# Patient Record
Sex: FEMALE | Race: WHITE | NOT HISPANIC OR LATINO | URBAN - NONMETROPOLITAN AREA
[De-identification: names, ages, dates, MRNs, and addresses within clinical notes are randomized per-mention and may not be internally consistent; named-entity substitution may affect disease eponyms.]

---

## 2020-01-31 ENCOUNTER — IMPORTED ENCOUNTER (OUTPATIENT)
Dept: URBAN - NONMETROPOLITAN AREA CLINIC 1 | Facility: CLINIC | Age: 65
End: 2020-01-31

## 2020-01-31 PROBLEM — H43.813: Noted: 2020-01-31

## 2020-01-31 PROBLEM — H52.4: Noted: 2020-01-31

## 2020-01-31 PROBLEM — H52.13: Noted: 2020-01-31

## 2020-01-31 PROBLEM — H52.223: Noted: 2020-01-31

## 2020-01-31 PROBLEM — H25.13: Noted: 2020-01-31

## 2020-01-31 PROCEDURE — 92015 DETERMINE REFRACTIVE STATE: CPT

## 2020-01-31 PROCEDURE — 92004 COMPRE OPH EXAM NEW PT 1/>: CPT

## 2020-01-31 NOTE — PATIENT DISCUSSION
Compound Myopic Astigmatism OU w/Presbyopia-  discussed findings w/patient-  new spectacle Rx issued-  monitor yearly or prn Cataracts OU -  discussed findings w/patient-  no treatment indicated at this time -  UIV protection recommended-  monitor yearly w/BAT or prn PVD OU -  discussed findings w/patient-  The risk of retinal detachment in patients with PVDs was discussed with the patient and the warning signs of retinal detachment were carefully reviewed with the patient. -  The patient was warned to return to the office or contact the ophthalmologist on call immediately if they experience signs of retinal detachment or changes in vision from today -  monitor yearly or prn; 's Notes: MR 1/31/2020DFE 1/31/2020

## 2021-09-07 ENCOUNTER — IMPORTED ENCOUNTER (OUTPATIENT)
Dept: URBAN - NONMETROPOLITAN AREA CLINIC 1 | Facility: CLINIC | Age: 66
End: 2021-09-07

## 2021-09-07 PROCEDURE — 92015 DETERMINE REFRACTIVE STATE: CPT

## 2021-09-07 PROCEDURE — 92014 COMPRE OPH EXAM EST PT 1/>: CPT

## 2021-09-07 NOTE — PATIENT DISCUSSION
Compound Myopic Astigmatism OU w/Presbyopia-  discussed findings w/patient-  new spectacle Rx issued-  monitor yearly or prn Cataracts OU -  discussed findings w/patient-  no treatment indicated at this time -  UV protection recommended-  monitor yearly w/BAT or prn PVD OU -  discussed findings w/patient-  The risk of retinal detachment in patients with PVDs was discussed with the patient and the warning signs of retinal detachment were carefully reviewed with the patient. -  The patient was warned to return to the office or contact the ophthalmologist on call immediately if they experience signs of retinal detachment or changes in vision from today -  monitor yearly or prn ALBINO OU-  discussed findings w/patient-  start Lotemax QID OU-  start Restasis BID OU-  RTC 2 weeks f/u or prn; 's Notes:  9/7/2021DFE 9/7/2021

## 2021-09-08 PROBLEM — H43.813: Noted: 2020-01-31

## 2021-09-08 PROBLEM — H52.4: Noted: 2020-01-31

## 2021-09-08 PROBLEM — H25.13: Noted: 2020-01-31

## 2021-09-08 PROBLEM — H16.223: Noted: 2021-09-08

## 2021-09-08 PROBLEM — H52.13: Noted: 2020-01-31

## 2021-09-08 PROBLEM — H52.223: Noted: 2020-01-31

## 2022-04-09 ASSESSMENT — VISUAL ACUITY
OU_CC: J1+
OD_SC: 20/20
OU_SC: 20/20
OD_SC: 20/20-1
OS_SC: 20/20
OS_SC: 20/20-1

## 2022-04-09 ASSESSMENT — TONOMETRY
OS_IOP_MMHG: 14
OD_IOP_MMHG: 14
OD_IOP_MMHG: 14
OS_IOP_MMHG: 15

## 2023-02-01 RX ORDER — BUPROPION HYDROCHLORIDE 300 MG/1
300 TABLET ORAL DAILY
COMMUNITY

## 2023-02-01 RX ORDER — VALSARTAN AND HYDROCHLOROTHIAZIDE 320; 12.5 MG/1; MG/1
1 TABLET, FILM COATED ORAL DAILY
COMMUNITY

## 2023-02-01 RX ORDER — CITALOPRAM 20 MG/1
20 TABLET ORAL DAILY
COMMUNITY

## 2023-02-01 RX ORDER — POTASSIUM CHLORIDE 600 MG/1
4 TABLET, FILM COATED, EXTENDED RELEASE ORAL ONCE
COMMUNITY

## 2023-02-01 RX ORDER — ATORVASTATIN CALCIUM 40 MG/1
20 TABLET, FILM COATED ORAL DAILY
COMMUNITY

## 2023-05-17 ENCOUNTER — COMPREHENSIVE EXAM (OUTPATIENT)
Dept: RURAL CLINIC 1 | Facility: CLINIC | Age: 68
End: 2023-05-17

## 2023-05-17 DIAGNOSIS — H16.223: ICD-10-CM

## 2023-05-17 DIAGNOSIS — H43.813: ICD-10-CM

## 2023-05-17 DIAGNOSIS — H25.13: ICD-10-CM

## 2023-05-17 PROCEDURE — 99214 OFFICE O/P EST MOD 30 MIN: CPT

## 2023-05-17 ASSESSMENT — TONOMETRY
OD_IOP_MMHG: 15
OS_IOP_MMHG: 15

## 2023-05-17 ASSESSMENT — VISUAL ACUITY
OD_CC: 20/20-1
OS_CC: 20/20-1

## 2024-07-29 ENCOUNTER — COMPREHENSIVE EXAM (OUTPATIENT)
Dept: RURAL CLINIC 1 | Facility: CLINIC | Age: 69
End: 2024-07-29

## 2024-07-29 DIAGNOSIS — H25.13: ICD-10-CM

## 2024-07-29 DIAGNOSIS — H43.813: ICD-10-CM

## 2024-07-29 DIAGNOSIS — H16.223: ICD-10-CM

## 2024-07-29 PROCEDURE — 92014 COMPRE OPH EXAM EST PT 1/>: CPT

## 2024-07-29 ASSESSMENT — VISUAL ACUITY
OS_CC: 20/20
OS_CC: 20/20-1
OD_CC: 20/20
OU_CC: 20/20
OU_CC: 20/20
OD_CC: 20/20-1

## 2024-07-29 ASSESSMENT — TONOMETRY
OS_IOP_MMHG: 15
OD_IOP_MMHG: 15

## 2025-07-25 ENCOUNTER — COMPREHENSIVE EXAM (OUTPATIENT)
Age: 70
End: 2025-07-25

## 2025-07-25 DIAGNOSIS — H16.223: ICD-10-CM

## 2025-07-25 DIAGNOSIS — H25.13: ICD-10-CM

## 2025-07-25 DIAGNOSIS — H43.813: ICD-10-CM

## 2025-07-25 PROCEDURE — 92014 COMPRE OPH EXAM EST PT 1/>: CPT
